# Patient Record
(demographics unavailable — no encounter records)

---

## 2018-01-27 NOTE — DIAGNOSTIC IMAGING REPORT
EXAMINATION:  CHEST SINGLE (PORTABLE)    



INDICATION:  Shortness of breath, line placement  

\S\ERMD ORDER

\S\[id number]

\S\1001

\S\Y    



COMPARISON:  None

     

FINDINGS:  AP view   



TUBES and LINES:  Right IJ central venous catheter with tip overlying the

cavoatrial junction.



LUNGS:  Low lung volumes.  Bilateral pulmonary edema.   Bibasilar atelectasis.

Both costophrenic angles not included on this exam.



PLEURA:  Unable to evaluate the small pleural effusions but no large pleural

effusions seen. No pneumothorax on limited evaluation.



HEART AND MEDIASTINUM:  Marked enlargement of the cardiac silhouette may be due

to cardiomegaly and/or pericardial effusion.



BONES AND SOFT TISSUES:  No acute osseous lesion.  Soft tissues are

unremarkable.



UPPER ABDOMEN: No free air under the diaphragm.    



IMPRESSION: 

Right adjacent and venous catheter with tip overlying the cavoatrial junction.



Marked enlargement of the cardiac silhouette due to cardiomegaly and/or

pericardial effusion. Bilateral pulmonary edema.





Signed by: Dr. Isabel Rowe M.D. on 1/27/2018 10:21 AM

## 2018-01-27 NOTE — HISTORY AND PHYSICAL
PRIMARY CARE PROVIDER:  Dr. Joe Valladares



CHIEF COMPLAINT:  Respiratory failure.



HISTORY OF PRESENT ILLNESS:  Ms. Oliveira is a 57-year-old who presented with 

acute shortness of breath that started in the last couple of days with 

wheezing and dyspnea on exertion and marked respiratory distress.  She was 

becoming somnolent here in the ER.  ABG showed a pCO2 of 82 and a pH of 

7.28.  The patient was urgently intubated for acute respiratory failure due 

to hypercapnia.



REVIEW OF SYSTEMS:  Unobtainable as the patient is intubated.



PAST MEDICAL HISTORY:  Significant for COPD is all we know.  



MEDICATIONS:  We have no list of home meds.  



Unclear if the patient has any kind of surgical history.  



PAST MEDICAL HISTORY:  Her family with her son denied any history of 

hypertension or diabetes.  



ALLERGIES:  SHE HAS NO KNOWN DRUG ALLERGIES. 



FAMILY HISTORY:  Unremarkable.



SOCIAL HISTORY:  Again, unknown at this time.



PHYSICAL EXAMINATION 

PSYCHIATRIC:  Is unobtainable.  Patient is sedated.  She is morbidly obese. 



VITAL SIGNS:  Blood pressure 122/59.  She was not hypotensive at any time.  

Pulse is 94.  She was a little more tachycardic around 114 on arrival.  

Temperature is 98.5, respiratory rate 30, O2 100% on the ventilator. 

HEENT:  Her head is atraumatic.  Eyes are anicteric with clear 

conjunctivae.  Ears and nares are without erythema or discharge.  She is 

orally intubated. 

NECK:  Supple.  No mass or thyromegaly. 

LYMPHATIC SYSTEM:  She has no palpable cervical, axillary or inguinal 

adenopathy. 

CARDIOVASCULAR:  Her heart has a regular rate and rhythm with frequent PACs 

and PVCs.  She has no carotid bruit.  She has no peripheral edema.  Weak 

dorsal pedal pulses. 

RESPIRATORY:  Reveals coarse breath sounds with some expiratory wheezing.  

She is currently being ventilated on assist control. 

GASTROINTESTINAL:  Abdomen is soft without organomegaly, masses or 

tenderness.  She has normal bowel sounds present. 

CUTANEOUS:  Her skin is warm and dry to touch with no rash or skin 

breakdown. 

MUSCULOSKELETAL:  Her joints are in normal alignment without erythema or 

swelling.  She has no calf tenderness. 

NEUROLOGIC:  Nonfocal.  She is moving all extremities.  She is sedated and 

not following commands. 



DIAGNOSTIC STUDIES:  Initial chest x-ray showed cardiomegaly and pulmonary 

edema.  Subsequent chest x-ray showed intubation and worsening in the 

pulmonary edema.  Her UA is clear.  Flu screen is negative.  Her EKG is 

read as AFib/flutter, but actually appears to be normal sinus rhythm, clear 

P-waves and a rate of 75.  It is unlikely to be a flutter.  Her lactic acid 

is 6.2.  BNP 1909.2.  Troponin 0.081.  Blood gas shows a pH of 7.28, 82 CO2 

and 109 O2.  Her chemistry shows normal electrolytes.  CO2 is 36, 

creatinine 2.09, BUN 45 for a GFR of 24.  Calcium 9.2.  Glucose 143.  

Transaminases, bilirubin and alk phos were normal.  CBC shows a white count 

of 8.03 with 69% neutrophils, 12% band forms, 10% lymphocytes, 9% 

monocytes.  Hemoglobin 11.3, hematocrit 36.8, and platelet count 194,000.  

Coags are normal.



IMPRESSION AND PLAN 

1.  Acute respiratory failure:  The patient is being admitted to the 

intensive care unit on ventilator support.  Pulmonology will be consulted 

for ventilator management.

2.  Acute exacerbation of chronic obstructive pulmonary disease:  The 

patient will be receiving aggressive nebulizer treatments, intravenous 

Solu-Medrol, intravenous Zithromax, and Rocephin.



3.  Acute-on-chronic systolic heart failure:  The patient is started on 

intravenous Lasix q.8 h.  We have no old records here.  Will obtain an 

echocardiogram to assess left ventricular function.

4.  Acute kidney injury versus chronic kidney disease:  The patient's renal 

function will be monitored while the patient is receiving Lasix.  Will 

consider nephrology consult.

5.  Hyperglycemia without history of diabetes:  Will check hemoglobin A1c 

and place the patient on sliding scale insulin.

6.  For prophylaxis, the patient will be on heparin for deep venous 

thrombosis prophylaxis and Pepcid for gastrointestinal prophylaxis.



  







DD:  01/27/2018 13:50

DT:  01/27/2018 15:25

Job#:  [id number]

## 2018-01-27 NOTE — DIAGNOSTIC IMAGING REPORT
EXAMINATION:  CHEST SINGLE (PORTABLE)    



INDICATION:       

\S\intubation

\S\[id number]

\S\1100    



COMPARISON:  Chest radiograph from 1/27/2018

     

FINDINGS:  AP view   



TUBES and LINES:  Interval intubation with endotracheal tube tip located 2.1 cm

above the carly. Right IJ central venous catheter with tip now overlying the

upper SVC, this appears higher when compared to most recent chest radiograph.



LUNGS:  No lumbar levels.  Worsening bilateral pulmonary edema.   Superimposed

infection cannot be excluded.



PLEURA:  No pleural effusion or pneumothorax.



HEART AND MEDIASTINUM:  Marked enlargement of the cardiac silhouette may be due

to cardiomegaly and/or pericardial effusion.  



BONES AND SOFT TISSUES:  No acute osseous lesion.  Soft tissues are

unremarkable.



UPPER ABDOMEN: No free air under the diaphragm.    



IMPRESSION: 

Interval intubation. Right IJ central venous catheter tip is higher in the SVC

now.



Worsening bilateral pulmonary edema. Superimposed infection cannot be excluded.





Signed by: Dr. Isabel Rowe M.D. on 1/27/2018 11:15 AM

## 2018-01-28 NOTE — DIAGNOSTIC IMAGING REPORT
Exam:Abdominal radiograph one view



History:Enteric tube placement



Comparison: None available



Findings:See impression





Impression:



Nonobstructive bowel gas pattern.



Enteric tube with the distal tip left hemiabdomen presumably in the lateral

stomach body.



Signed by: Dr. Andrew Palisch, M.D. on 1/28/2018 5:18 PM

## 2018-01-28 NOTE — CONSULTATION
DATE OF CONSULTATION:  January 27, 2018 



PULMONARY CRITICAL CARE CONSULTATION 



REASON FOR CONSULT:  ICU management and respiratory failure.



HPI:  Ms. Oliveira is a 57-year-old female, patient of Dr. Valladares, who 

presented with worsening dyspnea and shortness of breath.   Patient was 

intubated in the emergency room.  Chest x-ray is showing evidence of fluid 

overload and large pulmonary vasculature.  Patient is morbidly obese.  

Currently, sedated and intubated.  The source of history is the chart and 

the patient's daughter.  Per daughter, the patient has a history of 

obstructive sleep apnea.  She has been a smoker for 45 years and quit a 

year ago.  She also has a history of heart failure.  She uses home oxygen.  

She was intubated 2 months ago at [facility] with 

similar problems.  She is noncompliant with CPAP at home.  Currently, 

living in a hotel as she is displaced from [place].  



REVIEW OF SYSTEMS:  Unable to elicit any as the patient is intubated and 

sedated.



PAST MEDICAL HISTORY:  Obesity, obstructive sleep apnea, congestive heart 

failure, hypertension, and diabetes.    



FAMILY AND SOCIAL HISTORY:  Ex-smoker.  Smoked for 45 years.  Patient does 

not drink.  No alcohol history.  



PHYSICAL EXAMINATION

VITAL SIGNS:  Temperature 98.5, pulse of 85, blood pressure 135/75, 

respiratory rate of 18.

SKIN:  Warm and dry. 

HEENT:  Head is atraumatic and normocephalic.  Pupils reactive.  She is 

intubated and morbidly obese. 

NECK:  Supple. 

CHEST:  Clear to auscultation bilaterally.  Crackles on the bases. 

HEART:  S1 and S2 audible. 

ABDOMEN:  Soft, nontender and nondistended. 

EXTREMITIES:  Pedal edema. 

NEUROLOGIC:  Sedated and intubated. 



LABS:  White count of 8000, hemoglobin 11.3 and platelets 194,000.  

Chemistry:  Sodium 141, potassium 5.1, chloride 98, BUN 45, creatinine 

2.09.  AST and ALT normal.  Chest x-ray:  I have reviewed the images and is 

showing evidence of pulmonary edema with possibility of pneumonia as well.  

BNP is 1900.



ASSESSMENT AND PLAN:  Ms. Oliveira is a 57-year-old female who presented acute 

hypoxic respiratory failure.  Patient has a history of chronic hypoxia, 

chronic obstructive pulmonary disease, obstructive sleep apnea, likely 

obesity hypoventilation.  In looking at her body habitus, she is morbidly 

obese.



CURRENT PROBLEMS

1.  Acute-on-chronic hypoxic and hypercapnic respiratory failure:  Arterial 

blood gas done in the emergency room showed pCO2 of 82 and pH of 7.28.

2.  Morbid obesity.

3.  History of obstructive sleep apnea.

4.  Likely has chronic obstructive pulmonary disease:  Ex-smoker, 

50-pack-year smoking history.

5.  Possibility of congestive heart failure and pulmonary hypertension as 

well.

6.  Possible pneumonia.



PLAN

1.  Continue the patient on ventilator support.  Currently, on FIO2 of 100% 

and PEEP of 5.  I have changed the PEEP to 8 and decreased the FIO2 to 90% 

to wean the oxygen to keep the O2 sat more than or equal to 92%.

2.  Agree with IV Rocephin, azithromycin and Solu-Medrol.

3.  Lasix 40 mg IV q.8 h.

4.  DuoNeb treatment.

5.  Continue the patient on heparin subcutaneous for DVT prophylaxis.

6.  Acute kidney injury is likely due to fluid overload.  Hopefully, will 

improve with diuresis.  Discussed with the patient's daughter at bedside in 

detail.  Critical care time 45 minutes.  



 





DD:  01/27/2018 14:52

DT:  01/28/2018 02:15

Job#:  [id number]

## 2018-01-28 NOTE — DIAGNOSTIC IMAGING REPORT
Examination: Single AP view of the chest.



COMPARISON: January 27, 2018



INDICATION: Respiratory failure

     

DISCUSSION:



Lines/tubes:  Stable endotracheal tube, enteric tube, and right IJ catheter.



Lungs:  Stable interstitial and alveolar consolidation bilaterally.



Pleura:  Layering effusions.



Heart and mediastinum:  Heart size enlarged.



Bones and soft tissues:  No acute bony abnormalities.     



IMPRESSION:

 

1.   Cardiomegaly with stable pulmonary edema. Correlate for underlying

pneumonia.



Signed by: Dr. Andrew Palisch, M.D. on 1/28/2018 7:32 AM

## 2018-01-29 NOTE — DIAGNOSTIC IMAGING REPORT
PROCEDURE:

A single AP view of the chest.

 

COMPARISON: 1/28/18

INDICATIONS:   INTUBATION

     

FINDINGS:

Lines/tubes: Stable endotracheal and nasogastric tubes. Stable right 

internal jugular central line.

 

Lungs: Limited by low lung volumes and body habitus. Central vascular 

congestion and mild interstitial edema.

 

Pleura:  There is no visible pneumothorax. Trace bilateral pleural 

effusions suspected.

 

Heart and mediastinum: Enlarged cardiomediastinal silhouette. Aorta is 

calcified and mildly tortuous.

 

Bones:  No acute bony abnormality.

 

IMPRESSION: 

 

No significant interval change from prior exam.

Central vascular congestion and mild interstitial edema.

Enlarged cardiac silhouette and suspected trace bilateral pleural 

effusions.

 

 

Dictated by:  Misael Morgan M.D. on 1/29/2018 at 11:29     

Electronically approved by:  Misael Morgan M.D. on 1/29/2018 at 11:29

## 2018-01-30 NOTE — DIAGNOSTIC IMAGING REPORT
PROCEDURE:

A single AP view of the chest.

 

COMPARISON: Portable chest 1/29/2018.

 

INDICATIONS:   Respiratory failure, Intubated

     

FINDINGS:

Lines/tubes: Right internal jugular temporary central venous catheter 

with tip projecting over the expected region of the superior vena cava. 

 

 

Endotracheal catheter is present with the tip projecting over the 

expected region of the trachea, positioned 4 cm from the carly. 

 

Enteric feeding catheter with tip extending below the inferior margin 

of the examination, likely within the gastric body.

 

Lungs: Airspace opacity in the left lung base. No parenchymal mass.

 

Pleura:  There is no pleural effusion or pneumothorax.

 

Heart and mediastinum:  The heart and the mediastinum are unremarkable.

 

Bones: Degenerative changes of the thoracic spine.

 

IMPRESSION: 

 

Airspace opacity in the left lung base may represent atelectasis or 

developing pneumonia.

 

Dictated by:  Dru Whyte M.D. on 1/30/2018 at 8:36     

Electronically approved by:  Dru Whyte M.D. on 1/30/2018 at 8:36

## 2018-01-31 NOTE — DIAGNOSTIC IMAGING REPORT
PROCEDURE:US CHEST (INCL MEDIASTINUM)

COMPARISON:[facility], DX, CHEST SINGLE (PORTABLE), 

1/30/2018, 7:49.

INDICATIONS:left effusion

TECHNIQUE:Grayscale ultrasound chest bilaterally

 

FINDINGS:

No evidence of pleural effusion bilaterally.

 

 

CONCLUSION:

No conspicuous pleural effusion.

 

 

Dictated by:  Arsen Cabello M.D. on 1/31/2018 at 11:55     

Electronically approved by:  Arsen Cabello M.D. on 1/31/2018 at 

11:55

## 2018-02-01 NOTE — DIAGNOSTIC IMAGING REPORT
PROCEDURE:

A single AP view of the chest.

 

COMPARISON: Chest radiograph 1/30/2018

INDICATIONS:   RESPIRATORY FAILURE

     

FINDINGS:

Lines/tubes:  Previous right IJ central venous tip overlies the 

expected brachiocephalic confluence.  Previous endotracheal tube 

currently not visualized.

 

Lungs: Persistent retrocardiac opacity may reflect atelectasis or 

pneumonia.

 

Pleura: Likely small bilateral pleural effusions. No pneumothorax.

 

Heart and mediastinum: Stable enlargement of the cardiac silhouette.

 

Bones:  No acute bony abnormality.

 

IMPRESSION: 

Persistent retrocardiac opacity may reflect atelectasis or pneumonia.

 

 

 

Dictated by:  Romeo Short M.D. on 2/01/2018 at 14:25     

Electronically approved by:  Romeo Short M.D. on 2/01/2018 at 14:26

## 2018-02-04 NOTE — DIAGNOSTIC IMAGING REPORT
EXAMINATION: Chest,  CHEST SINGLE (PORTABLE)    



INDICATION: Chest pain



COMPARISON:  Portable chest 1/28/2018

     

FINDINGS:    



LINES:  Right internal jugular temporary central venous catheter with tip

projecting over the expected region of the superior vena cava.



Heart:  Normal cardiac silhouette.



Vascular: The pulmonary vasculature is within normal limits.  Atherosclerotic

calcifications of the aortic arch.



Mediastinum: No mediastinal, hilar, or axillary mass or lymphadenopathy.



Lungs: No parenchymal mass.  Airspace opacity in the left lung base.



Pleura:  Small left pleural effusion.  No pneumothorax.



Bones: No acute osseous abnormality.  Degenerative changes of the thoracic

spine.



Soft tissues:  Normal.



Impression: 

Airspace opacity in the left lung base may represent a developing pneumonia. 

Small left pleural effusion.



Signed by: Dr. Dru Whyte M.D. on 2/4/2018 8:55 AM

## 2018-02-06 NOTE — DISCHARGE SUMMARY
ADMISSION DIAGNOSES 

1. Acute respiratory failure. 

2. Acute exacerbation of chronic obstructive pulmonary disease. 

3. Acute-on-chronic systolic heart failure. 

4. Acute kidney injury versus chronic kidney disease. 

5. Hyperglycemia.  



DISCHARGE DIAGNOSES   

1. Acute respiratory failure. 

2. Acute exacerbation of chronic obstructive pulmonary disease. 

3. Acute-on-chronic systolic heart failure. 

4. Acute kidney injury versus chronic kidney disease. 

5. Hyperglycemia.  

6.   Ruled out influenza.  

7.   Hypermagnesemia.  



HISTORY:   The patient has a history of COPD, diabetes, CHF, CKD. 



HOSPITAL COURSE:   A 57-year-old female presented with shortness of breath 

that started a couple of days ago and also complains of wheezing, dyspnea 

on exertion and respiratory distress. ABG was done in the ER that showed a 

pCO2 of 82 and a pH of 7.28.  The patient was intubated due to hypercapnia. 

 The patient was initially put in ICU on ventilator support and pulmonary 

was consulted.  The patient was started on aggressive nebulizer treatment, 

IV Solu-Medrol, Zithromax and Rocephin.  The patient also started on Lasix 

q.8 h.  Echo was done that showed EF of 55% to 60% and mild mitral regurg 

present.  Chest x-ray on admission showed enlargement of cardiac silhouette 

due to cardiomegaly and/or pericardial effusion, bilateral pulmonary edema. 

 Ultrasound of the chest showed no pleural effusion.  Blood cultures were 

negative.  Urine culture was negative.  A few days after admission, a 

sputum culture was completed that showed MRSA.  Dr. Meraz, pulmonologist, 

was consulted to take care of the ventilator.  The patient was started on 

FiO2 of 100%, PEEP of 5, decreased to PEEP of 8 and FiO2 of 90.  The 

patient was weaned off but continued IV antibiotics and Solu-Medrol along 

with Lasix q.8 h. The patient was given IV vancomycin after the sputum 

culture came back positive for MRSA.  Lasix was tapered down to 40 mg 

daily.  The patient will be discharged home with family as she has oxygen 

at home and all the ME equipment that she needs.  She is requesting BiPAP 

for home and has done a sleep apnea study about a year ago.  Case 

management sent documentation to try to get her that at home.  She will 

follow up with pulmonary in 1-2 weeks and will resume all home medicines 

plus Bactrim meds, prednisone and Lasix.  





Dictated by:  Catrina [place], NP 

 



 _________________________________

BRIAN MELVIN MD



DD:  02/06/2018 17:45

DT:  02/06/2018 18:10

Job#:  [id number]

## 2018-03-05 NOTE — XMS REPORT
Patient Summary Document

 Created on: 2018



MARGY PEÑA

External Reference #: [id number]

: 1960

Sex: Female



Demographics







 Address  [address] [place] [address]

 

 Home Phone  [phone]

 

 Preferred Language  Unknown

 

 Marital Status  Unknown

 

 Cheondoism Affiliation  Unknown

 

 Race  Unknown

 

 Ethnic Group  Unknown





Author







 [facility]  [facility]

 

 [facility]

 

 Address  Unknown

 

 Phone  Unavailable







Care Team Providers







 Care Team Member Name  Role  Phone

 

 BRIAN MELVIN  Unavailable  Unavailable







Problems

This patient has no known problems.



Allergies, Adverse Reactions, Alerts

This patient has no known allergies or adverse reactions.



Medications

This patient has no known medications.



Results







 Test Description  Test Time  Test Comments  Text Results  Atomic Results  
Result Comments









 CHEST SINGLE (PORTABLE)            [place] [address]      Patient Name: MARGY PEÑA   MR #: [id number]    : 1960 Age/Sex: 57/F  Acct #: 
[id number] Req #: [id number]  [facility] Physician: BRIAN MELVIN MD    Ordered by: 
Sagar Chan NP  Report #: [id number]   Location: MED/SURG2  Room/Bed: [facility] 
   _____________________________________________________________________________
______________________    Procedure: [id number] DX/CHEST SINGLE (PORTABLE)  Exam 
Date: 18                            Exam Time: 0745       REPORT STATUS: 
Signed    EXAMINATION: Chest,  CHEST SINGLE (PORTABLE)          INDICATION: 
Chest pain      COMPARISON:  Portable chest 2018           FINDINGS:      
    LINES:  Right internal jugular temporary central venous catheter with tip   
projecting over the expected region of the superior vena cava.      Heart:  
Normal cardiac silhouette.      Vascular: The pulmonary vasculature is within 
normal limits.  Atherosclerotic   calcifications of the aortic arch.      
Mediastinum: No mediastinal, hilar, or axillary mass or lymphadenopathy.      
Lungs: No parenchymal mass.  Airspace opacity in the left lung base.      Pleura
:  Small left pleural effusion.  No pneumothorax.      Bones: No acute osseous 
abnormality.  Degenerative changes of the thoracic   spine.      Soft tissues:  
Normal.      Impression:    Airspace opacity in the left lung base may 
represent a developing pneumonia.    Small left pleural effusion.      Signed by
: Dr. Filomena Santana M.D. on 2018 8:55 AM        Dictated By: FILOMENA SANTANA MD  
Electronically Signed By: FILOMENA SANTANA MD on 18  Transcribed By: 
RICKY on 18       COPY TO:   SAGAR CHAN NP           

 

 CHEST SINGLE (PORTABLE)            [place] [address]      Patient Name: MARGY PEÑA   MR #: [id number]    : 1960 Age/Sex: 57/F  Acct #: 
[id number] Req #: [id number]  Adm Physician: BRIAN MELVIN MD    Ordered by: 
Sagar Chan NP  Report #: [id number]   Location: MED/SURG  Room/Bed: [facility] 
   _____________________________________________________________________________
______________________    Procedure: [id number] DX/CHEST SINGLE (PORTABLE)  Exam 
Date: 18                            Exam Time: 1340       REPORT STATUS: 
Signed    PROCEDURE:   A single AP view of the chest.       COMPARISON: Chest 
radiograph 2018   INDICATIONS:   RESPIRATORY FAILURE           FINDINGS:   
Lines/tubes:  Previous right IJ central venous tip overlies the    expected 
brachiocephalic confluence.  Previous endotracheal tube    currently not 
visualized.       Lungs: Persistent retrocardiac opacity may reflect 
atelectasis or    pneumonia.       Pleura: Likely small bilateral pleural 
effusions. No pneumothorax.       Heart and mediastinum: Stable enlargement of 
the cardiac silhouette.       Bones:  No acute bony abnormality.       
IMPRESSION:    Persistent retrocardiac opacity may reflect atelectasis or 
pneumonia.               Dictated by:  Romeo Alex M.D. on 2018 at 14:
25        Electronically approved by:  Romeo Alex M.D. on 2018 at 14:
26                   Dictated By: ROMEO ALEX MD  Electronically Signed By: 
ROMEO ALEX MD on 18  Transcribed By: ELVIA on 18       
COPY TO:   SAGAR CHAN NP           

 

 US CHEST (INCL MEDIASTINUM)            [place] [address]      Patient Name: 
MARGY PEÑA   MR #: [id number]    : 1960 Age/Sex: 57/F  Acct #: 
[id number] Req #: [id number]  Adm Physician: BRIAN MELVIN MD    Ordered by: 
YORDAN SOTO MD  Report #: [id number]   Location: ICU  [facility]Bed[facility]    ___
________________________________________________________________________________
________________    Procedure: [id number] US/US CHEST (INCL MEDIASTINUM)  Exam 
Date:                             Exam Time:        REPORT STATUS: Signed    
PROCEDURE:   US CHEST (INCL MEDIASTINUM)   COMPARISON:   [facility]
, , CHEST SINGLE (PORTABLE),    2018, 7:49.   INDICATIONS:   left 
effusion   TECHNIQUE:   Grayscale ultrasound chest bilaterally       FINDINGS: 
     No evidence of pleural effusion bilaterally.           CONCLUSION:      No 
conspicuous pleural effusion.           Dictated by:  Royce Vela M.D. 
on 2018 at 11:55        Electronically approved by:  Royce Vela M.D. on 2018 at    11:55                Dictated By: ROYCE VELA MD  
Electronically Signed By: ROYCE VELA MD on 18 115  Transcribed By: 
ELVIA on 18 1155       COPY TO:   YORDAN SOTO MD           

 

 CHEST SINGLE (PORTABLE)            [address] [address]      Patient Name: MARGY PEÑA   MR #: [id number]    : 1960 Age/Sex: 57/F  Acct #: 
[id number] Req #: [id number]  Adm Physician: BRIAN MELVIN MD    Ordered by: 
YORDAN SOTO MD  Report #: [id number]   Location: ICU  Room/Bed: ICU [facility] access hospital    ___
________________________________________________________________________________
________________    Procedure: [id number] DX/CHEST SINGLE (PORTABLE)  Exam Date: 
18                            Exam Time: 0740       REPORT STATUS: Signed
    PROCEDURE:   A single AP view of the chest.       COMPARISON: Portable 
chest 2018.       INDICATIONS:   Respiratory failure, Intubated           
FINDINGS:   Lines/tubes: Right internal jugular temporary central venous 
catheter    with tip projecting over the expected region of the superior vena 
cava.            Endotracheal catheter is present with the tip projecting over 
the    expected region of the trachea, positioned 4 cm from the carly.        
Enteric feeding catheter with tip extending below the inferior margin    of the 
examination, likely within the gastric body.       Lungs: Airspace opacity in 
the left lung base. No parenchymal mass.       Pleura:  There is no pleural 
effusion or pneumothorax.       Heart and mediastinum:  The heart and the 
mediastinum are unremarkable.       Bones: Degenerative changes of the thoracic 
spine.       IMPRESSION:        Airspace opacity in the left lung base may 
represent atelectasis or    developing pneumonia.       Dictated by:  Filomena Santana M.D. on 2018 at 8:36        Electronically approved by:  Filomena Santana M.D. on 2018 at 8:36                Dictated By: FILOMENA SANTANA MD  
Electronically Signed By: FILOMENA SANTANA MD on 18  Transcribed By: 
ELVIA on 18       COPY TO:   YORDAN SOTO MD           

 

 CHEST SINGLE (PORTABLE)            [address] [address]      Patient Name: MARGY PEÑA   MR #: [id number]    : 1960 Age/Sex: 57/F  Acct #: 
[id number] Req #: [id number]  Adm Physician: BRIAN MELVIN MD    Ordered by: 
YORDAN SOTO MD  Report #: [id number]   Location: ICU  Room/Bed: ICU [facility]    ___
________________________________________________________________________________
________________    Procedure: [id number] DX/CHEST SINGLE (PORTABLE)  Exam Date: 
18                            Exam Time: 0910       REPORT STATUS: Signed
    PROCEDURE:   A single AP view of the chest.       COMPARISON: 18   
INDICATIONS:   INTUBATION           FINDINGS:   Lines/tubes: Stable 
endotracheal and nasogastric tubes. Stable right    internal jugular central 
line.       Lungs: Limited by low lung volumes and body habitus. Central 
vascular    congestion and mild interstitial edema.       Pleura:  There is no 
visible pneumothorax. Trace bilateral pleural    effusions suspected.       
Heart and mediastinum: Enlarged cardiomediastinal silhouette. Aorta is    
calcified and mildly tortuous.       Bones:  No acute bony abnormality.       
IMPRESSION:        No significant interval change from prior exam.   Central 
vascular congestion and mild interstitial edema.   Enlarged cardiac silhouette 
and suspected trace bilateral pleural    effusions.           Dictated by:  
Misael Mathis M.D. on 2018 at 11:29        Electronically approved by:  
Misael Mathis M.D. on 2018 at 11:29                   Dictated By: MISAEL MATHIS MD  Electronically Signed By: MISAEL MATHIS MD on 18  
Transcribed By: ELVIA on 18       COPY TO:   YORDAN SOTO MD         
  

 

 [facility] ([place] [address]      Patient Name: MARGY PEÑA
   MR #: [id number]    : 1960 Age/Sex: 57/F  Acct #: [id number] Req #
: [id number]  Adm Physician: BRIAN MELVIN MD    Ordered by: BRIAN MELVIN MD  
Report #: [id number]   Location: ICU  Room/Bed: -1    ____________________
_______________________________________________________________________________
    Procedure: [id number] DX/ABDOMEN-1VIEW (KUB)  Exam Date: 18           
                 Exam Time: 1630       REPORT STATUS: Signed    Exam:Abdominal 
radiograph one view      History:Enteric tube placement      Comparison: None 
available      Findings:See impression         Impression:      Nonobstructive 
bowel gas pattern.      Enteric tube with the distal tip left hemiabdomen 
presumably in the lateral   stomach body.      Signed by: Dr. Andrew Palisch, M.D. on 2018 5:18 PM        Dictated By: ANDREW R PALISCH MD  
Electronically Signed By: ANDREW R PALISCH MD on 18  Transcribed By: 
RICKY on 18       COPY TO:   BRIAN MELVIN MD           

 

 CHEST SINGLE (PORTABLE)            [place] [address]      Patient Name: MARGY PEÑA   MR #: [id number]    : 1960 Age/Sex: 57/F  Acct #: 
[id number] Req #: [id number]  Adm Physician: BRIAN MELVIN MD    Ordered by: 
BRIAN MELVIN MD  Report #: [id number]   Location: ICU  Room/Bed: -    _
________________________________________________________________________________
__________________    Procedure: [id number] DX/CHEST SINGLE (PORTABLE)  Exam Date
: 18                            Exam Time: 0640       REPORT STATUS: 
Signed    Examination: Single AP view of the chest.      COMPARISON:       INDICATION: Respiratory failure           DISCUSSION:      Lines/
tubes:  Stable endotracheal tube, enteric tube, and right IJ catheter.      
Lungs:  Stable interstitial and alveolar consolidation bilaterally.      Pleura
:  Layering effusions.      Heart and mediastinum:  Heart size enlarged.      
Bones and soft tissues:  No acute bony abnormalities.           IMPRESSION:    
   1.   Cardiomegaly with stable pulmonary edema. Correlate for underlying   
pneumonia.      Signed by: Dr. Andrew Palisch, M.D. on 2018 7:32 AM        
Dictated By: ANDREW R PALISCH MD  Electronically Signed By: ANDREW R PALISCH MD 
on 18  Transcribed By: RICKY on 18       COPY TO:   
BRIAN MELVIN MD           

 

 CHEST SINGLE (PORTABLE)            [place] [address]      Patient Name: MARGY PEÑA   MR #: [id number]    : 1960 Age/Sex: 57/F  Acct #: 
[id number] Req #: [id number]  Adm Physician:     Ordered by: FANTA KELLY MD  Report #: [id number]   Location: ER  Room/Bed:     ________________________
___________________________________________________________________________    
Procedure: [id number] DX/CHEST SINGLE (PORTABLE)  Exam Date: 18           
                 Exam Time: 1100       REPORT STATUS: Signed    EXAMINATION:  
CHEST SINGLE (PORTABLE)          INDICATION:                      COMPARISON:  
Chest radiograph from 2018           FINDINGS:  AP view         TUBES and 
LINES:  Interval intubation with endotracheal tube tip located 2.1 cm   above 
the carly. Right IJ central venous catheter with tip now overlying the   upper 
SVC, this appears higher when compared to most recent chest radiograph.      
LUNGS:  No lumbar levels.  Worsening bilateral pulmonary edema.   Superimposed 
  infection cannot be excluded.      PLEURA:  No pleural effusion or 
pneumothorax.      HEART AND MEDIASTINUM:  Marked enlargement of the cardiac 
silhouette may be due   to cardiomegaly and/or pericardial effusion.        
BONES AND SOFT TISSUES:  No acute osseous lesion.  Soft tissues are   
unremarkable.      UPPER ABDOMEN: No free air under the diaphragm.          
IMPRESSION:    Interval intubation. Right IJ central venous catheter tip is 
higher in the SVC   now.      Worsening bilateral pulmonary edema. Superimposed 
infection cannot be excluded.         Signed by: Dr. Isabel Westfall M.D. on 2018 11:15 AM        Dictated By: ISABEL WESTFALL MD  
Electronically Signed By: ISABEL WESTFALL MD on 18 1115  
Transcribed By: RICKY on 18 1115       COPY TO:   FANTA KELLY MD 
          

 

 CHEST SINGLE (PORTABLE)            [place] [address]      Patient Name: MARGY PEÑA   MR #: [id number]    : 1960 Age/Sex: 57/F  Acct #: 
[id number] Req #: [id number]  Adm Physician:     Ordered by: FANTA KELLY MD  Report #: [id number]   Location: ER  Room/Bed:     ________________________
___________________________________________________________________________    
Procedure: [id number] DX/CHEST SINGLE (PORTABLE)  Exam Date: 18           
                 Exam Time: 1001       REPORT STATUS: Signed    EXAMINATION:  
CHEST SINGLE (PORTABLE)          INDICATION:  Shortness of breath, line 
placement                    COMPARISON:  None           FINDINGS:  AP view    
     TUBES and LINES:  Right IJ central venous catheter with tip overlying the 
  cavoatrial junction.      LUNGS:  Low lung volumes.  Bilateral pulmonary 
edema.   Bibasilar atelectasis.   Both costophrenic angles not included on this 
exam.      PLEURA:  Unable to evaluate the small pleural effusions but no large 
pleural   effusions seen. No pneumothorax on limited evaluation.      HEART AND 
MEDIASTINUM:  Marked enlargement of the cardiac silhouette may be due   to 
cardiomegaly and/or pericardial effusion.      BONES AND SOFT TISSUES:  No 
acute osseous lesion.  Soft tissues are   unremarkable.      UPPER ABDOMEN: No 
free air under the diaphragm.          IMPRESSION:    Right adjacent and venous 
catheter with tip overlying the cavoatrial junction.      Marked enlargement of 
the cardiac silhouette due to cardiomegaly and/or   pericardial effusion. 
Bilateral pulmonary edema.         Signed by: Dr. Isabel Westfall M.D. 
on 2018 10:21 AM        Dictated By: ISABEL WESTFALL MD  
Electronically Signed By: ISABEL WESTFALL MD on 18 1021  
Transcribed By: RICKY on 18 1021       COPY TO:   FANTA KELLY MD

## 2018-03-05 NOTE — XMS REPORT
Continuity of Care Document

 Created on: 2018



MARGY OLIVEIRA

External Reference #: [id number]

: 1960

Sex: Female



Demographics







 Address  [address] [place]  [address]

 

 Home Phone  [phone]

 

 Preferred Language  Unknown

 

 Marital Status  Unknown

 

 [employer] Affiliation  Unknown

 

 Race  White

 

 Ethnic Group  Unknown





Author







 Author  [facility]

 

 Organization  [facility]

 

 Address  [address]

[place]  [address]



 

 Phone  Unavailable







Support







 Name  Relationship  Address  Phone

 

 BRIAN MELVIN MD  Caregiver  [address] [address]  [phone]

 

 BRIAN MELVIN MD  Caregiver  [address] [address]  [phone]

 

 BRIAN MELVIN MD  Caregiver  [address] [address]  [phone]

 

 BRIAN MELVIN MD  Caregiver  [address] [address]  [phone]

 

 FANTA KELLY MD  Caregiver  [address]

[place]  [address]  Unavailable

 

 SAGAR OLIVEIRA  Next Of Kin  [address] [place], [place]  [phone]







Care Team Providers







 Care Team Member Name  Role  Phone

 

 BRIAN MELVIN MD  PCP  [phone]







Insurance Providers







 Guarantor  Margy Oliveira

 

 Address  [address] [place] [address]

 

 Phone  [phone]

 

 Email  [email or web address]











 Payer  [employer] Complete

 

 Policy Number  [id number]

 

 Subscriber's Name  TeeMargy

 

 Relationship  18 Self / Same As Patient

 

 Effective Date  18







Advance Directives







 Directive  Response  Recorded Date/Time

 

 Does the patient have an advance directive?  No  18 12:53pm

 

 If yes, is advance directive on file with [facility]?  No  18 12:53pm

 

 If not on file with [facility] will patient provide a copy?  No  18 12:53pm

 

 Do you have a Directive to Physician?  No  18 10:10am

 

 Do you have a Medical Power of ?  No  18 10:10am

 

 Do you have an out of hospital Do Not Resuscitate Order?  No  18 10:10am

 

 Do you have any special needs we should be aware of?  No  18 10:10am

 

 Do you have a support person here with you today?  Yes  18 10:10am

 

 Did patient receive Notice of Privacy Practices?  Yes  18 10:10am

 

 Did patient receive patient rights and responsibilities?  Yes  18 10:10am







Problems

No problem information available.



Medications





Current Home Medications





 Medication  Dose  Units  Route  Directions  Days  Qty  Instructions  Start Date

 

 Albuterol/Ipratropium Nebulize 3 Ml Inha  3  Ml  Nebullizer  Rt Q4h  30 Days  
      18

 

 Furosemide 40 Mg Tablet  40  Mg  Oral  Daily     30 Tab      

 

 Furosemide 40 Mg Tablet  40  Mg  Oral  Daily  30 Days        18

 

 Ipratropium/Albuterol Sulfate (Combivent Respimat Inhal Spray) 4 Gm Aer.w.adap
  4  Gm  Inhalation  Twice A Day            

 

 Prednisone 10 Mg Tab  10  Mg  Oral  Daily  5 Days        18

 

 Sulfamethoxazole/Trimethoprim (Bactrim Ds Tablet) 1 Each Tablet  1  Each  Oral
  Twice A Day  20 Days        18







Social History







 Social History Problem  Response  Recorded Date/Time  Onset Date  Status

 

 Hx Psychiatric Problems  No  2018 12:53pm  Not Applicable  Not Applicable

 

 Hx Eating Disorder  No  2018 12:53pm  Not Applicable  Not Applicable

 

 Hx Substance Use Disorder  No  2018 12:53pm  Not Applicable  Not 
Applicable

 

 Hx Depression  No  2018 12:53pm  Not Applicable  Not Applicable

 

 Hx Alcohol Use  No  2018 12:53pm  Not Applicable  Not Applicable

 

 Hx Substance Use Treatment  No  2018 12:53pm  Not Applicable  Not 
Applicable

 

 Hx Physical Abuse  No  2018 12:53pm  Not Applicable  Not Applicable











 Smoking Status  Start Date  Stop Date

 

 Former smoker      







Hospital Discharge Instructions

No hospital discharge instruction information available.



Plan of Care







 Discharge Date  18 4:52pm

 

 Disposition  HOME, SELF-CARE

 

 Instructions/Education Provided  Pneumonia - Bacterial

 

 Prescriptions  See Medication Section

 

 Referrals  YORDAN SOTO MD (Pulmonary)

Order Date: 3 Weeks

Entered Date: 2018 11:35am

Address:

 [address] [place] [address] [place] [address]

 [phone]

 

 Additional Instructions/Education  DIABETIC DIET







Functional Status







 Query  Response  Date Recorded

 

 FUNCTIONAL STATUS  .

  2018 11:46am

 

 Assistive Devices  Standard Walker

  2018 9:00am

 

 Ambulation Ability  Minimum Assistance

  2018 9:00am

 

 Toileting Ability  Minimum Assistance

  2018 1:49pm







Allergies, Adverse Reactions, Alerts







 Allergen  Type  Severity  Reaction  Status  Last Updated

 

 Penicillin  Allergy  Severe  ITCHING, ANAPHYLAXIS  Active  18

 

 Sulfa (Sulfonamide Antibiotics)  Allergy  Intermediate     Active  18

 

 Naproxen  Allergy  Intermediate     Active  18







Immunizations

No immunization information available.



Vital Signs





Acute Vital Signs





 Vital  Response  Date/Time

 

 Temperature (Fahrenheit)  96.1 degrees F (97.6 - 99.5)  2018 12:35pm

 

 Pulse      

 

    Pulse Rate (adult)  85 bpm (60 - 90)  2018 3:23pm

 

 Respiratory Rate  20 bpm (12 - 24)  2018 3:23pm

 

 Blood Pressure  112/51 mm Hg  2018 12:35pm

 

 Height  5 ft 3 in  2018 9:38am

 

 Weight  231 lb  2018 8:32am

 

 Body Mass Index  40.9 kg/m^2  2018 8:32am







Results





Laboratory Results





 Test Name  Result  Units  Flags  Reference  Collection Date/Time  Result Date/
Time  Comments

 

 White Blood Count  10.17  x10e3/uL     4.8-10.8  2018 5:30am  2018 
6:16am   

 

 Red Blood Count  3.84  x10e6/uL     3.6-5.1  2018 5:30am  2018 6:
16am   

 

 Hemoglobin  12.0  g/dL     12.0-16.0  2018 5:30am  2018 6:16am   

 

 Hematocrit  39.2  %     34.2-44.1  2018 5:30am  2018 6:16am   

 

 Mean Corpuscular Volume  102.1  fL   H  81-99  2018 5:30am  2018 6:
16am   

 

 Mean Corpuscular Hemoglobin  31.3  pg     28-32  2018 5:30am  2018 
6:16am   

 

 Mean Corpuscular Hemoglobin Concent  30.6  g/dL   L  31-35  2018 5:30am  
2018 6:16am   

 

 Red Cell Distribution Width  15.3  %   H  11.7-14.4  2018 5:2018 6:16am   

 

 Platelet Count  202  x10e3/uL     140-360  2018 5:2018 6:
16am   

 

 Neutrophils (%) (Auto)  69.4  %     38.7-80.0  2018 5:2018 6:
16am   

 

 Lymphocytes (%) (Auto)  19.9  %     18.0-39.1  2018 5:2018 6:
16am   

 

 Monocytes (%) (Auto)  7.8   %     4.4-11.3  2018 5:2018 6:
16am   

 

 Eosinophils (%) (Auto)  1.4  %     0.0-6.0  2018 5:2018 6:
16am   

 

 Basophils (%) (Auto)  0.2  %     0.0-1.0  2018 5:2018 6:16am
   

 

 IM GRANULOCYTES %  1.3  %   H  0.0-1.0  2018 5:2018 6:16am   

 

 Neutrophils # (Auto)  7.1      H  2.1-6.9  2018 5:2018 6:
16am   

 

 Lymphocytes # (Auto)  2.0        1.0-3.2  2018 5:2018 6:16am
   

 

 Monocytes # (Auto)  0.8        0.2-0.8  2018 5:2018 6:16am   

 

 Eosinophils # (Auto)  0.1        0.0-0.4  2018 5:2018 6:16am
   

 

 Basophils # (Auto)  0.0        0.0-0.1  2018 5:2018 6:16am   

 

 Absolute Immature Granulocyte (auto  0.13  x10e3/uL   H  0-0.1  2018 5:
2018 6:16am   

 

 Differential Total Cells Counted  100           2018 5:002018 8
:45am   

 

 Neutrophils % (Manual)  90  %   H  40-74  2018 5:00am  2018 8:45am
   

 

 Band Neutrophils %  12  %        2018 9:03am  2018 11:10am   

 

 Lymphocytes % (Manual)  9  %   L  19-48  2018 5:00am  2018 8:45am 
  

 

 Monocytes % (Manual)  2  %   L  3.4-9.0  2018 5:25am  2018 7:16am 
  

 

 Reactive Lymphocytes  1           2018 5:00am  2018 8:45am   

 

 Platelet Estimate  ADEQUATE           2018 5:00am  2018 8:45am   

 

 Platelet Morphology Comment  FEW GIANT           2018 5:00am  2018 
8:45am   

 

 Red Cell Morphology Comment  NORMAL           2018 5:00am  2018 8:
45am   

 

 Prothrombin Time  15.0  seconds   H  11.9-14.5  2018 9:03am  2018 
10:32am   

 

 Prothromb Time International Ratio  1.12           2018 9:03am  2018 10:32am  Oral Anticoagulant Therapy INR Values:



 1. Low Intensity Therapy        1.5 - 2.0



 2. Moderate Intensity Therapy   2.0 - 3.0



 3. High Intensity Therapy(1)    2.5 - 3.5



 4. High Intensity Therapy(2)    3.0 - 4.0



 5. Panic Value INR              > 5.0

 

 Activated Partial Thromboplast Time  31.5  seconds     23.8-35.5  2018 9:
03am  2018 10:32am   

 

 Urine Color  YELLOW        YELLOW  2018 12:00pm  2018 12:23pm   

 

 Urine Clarity  HAZY        CLEAR  2018 12:00pm  2018 12:23pm   

 

 Urine Specific Gravity  1.025        1.010-1.025  2018 12:00pm  2018 12:23pm   

 

 Urine pH  5        5 - 7  2018 12:00pm  2018 12:23pm   

 

 Urine Leukocyte Esterase  NEGATIVE        NEGATIVE  2018 12:00pm  2018 12:23pm   

 

 Urine Nitrite  NEGATIVE        NEGATIVE  2018 12:00pm  2018 12:
23pm   

 

 Urine Protein  1+      H  NEGATIVE  2018 12:00pm  2018 12:23pm   

 

 Urine Glucose (UA)  NEGATIVE        NEGATIVE  2018 12:00pm  2018 12
:23pm   

 

 Urine Ketones  NEGATIVE        NEGATIVE  2018 12:00pm  2018 12:
23pm   

 

 Urine Urobilinogen  1  mg/dL     0.2 - 1  2018 12:00pm  2018 12:
23pm   

 

 Urine Bilirubin  1+      H  NEGATIVE  2018 12:00pm  2018 12:23pm   

 

 Urine Blood  NEGATIVE        NEGATIVE  2018 12:00pm  2018 12:23pm 
  

 

 Urine WBC  0-5  /HPF     0-5  2018 12:00pm  2018 12:35pm   

 

 Urine RBC  0-5  /HPF     0-5  2018 12:00pm  2018 12:35pm   

 

 Urine Bacteria  NONE  /HPF     NONE  2018 12:00pm  2018 12:35pm   

 

 Urine Epithelial Cells  FEW  /LPF     NONE  2018 12:00pm  2018 12:
35pm   

 

 Urine Amorphous Sediment  MANY      H  FEW  2018 12:00pm  2018 12:
35pm   

 

 Sodium Level  140  mmol/L     136-145  2018 5:30am  2018 6:39am   

 

 Potassium Level  4.1  mmol/L     3.5-5.1  2018 5:30am  2018 6:39am
   

 

 Chloride Level  102  mmol/L       2018 5:30am  2018 6:39am   

 

 Influenza Virus Types A,B Antigen  NEGATIVE        NEGATIVE  2018 10:
13am  2018 10:46am   

 

 Carbon Dioxide Level  34  mmol/L   H   5:30am  2018 6:
39am   

 

 Anion Gap  8.1  mmol/L     8-16  2018 5:30am  2018 6:39am   

 

 Blood Urea Nitrogen  54  mg/dL   H  7-2018 5:30am  2018 6:39am
   

 

 Creatinine  1.30  mg/dL   H  0.57-1.11  2018 5:30am  2018 6:39am   

 

 BUN/Creatinine Ratio  42      H  6-25  2018 5:30am  2018 6:39am   

 

 Estimat Glomerular Filtration Rate  42  ML/MIN   L  60-  2018 5:30am  2018 6:39am  Ranges were taken from the National Kidney Disease Education 

Program and the National Kidney Foundation literature.







Reference ranges:



 60 or greater: Normal



 16-59 (for 3 consecutive months): Chronic kidney disease 



 15 or less: Kidney failure

 

 Glucose Level  167  mg/dL   H    2018 5:30am  2018 6:39am   

 

 Calcium Level  8.9  mg/dL     8.4-10.2  2018 5:30am  2018 6:39am   

 

 Bedside Glucose  299  mg/dL   H    2018 3:38pm  2018 4:10pm  
Meter ID: [id number]



 

 Hemoglobin A1c Percent  7.2  %   H  4.0-7.0  2018 4:20am  2018 8:
59am   

 

 Lactic Acid Level  6.2  MG/DL     4.5-19.8  2018 9:03am  2018 10:
31am   

 

 Magnesium Level  2.2  MG/DL   H  1.3-2.1  2018 5:30am  2018 6:39am
   

 

 Iron Level  41  ug/dL   L    2018 4:20am  2018 8:59am   

 

 Total Iron Binding Capacity  273  ug/dL     261-478  2018 4:20am  2018 8:59am   

 

 Percent Iron Saturation  15  %     15-50  2018 4:20am  2018 8:59am
   

 

 Transferrin  195  mg/dL     180-382  2018 4:20am  2018 8:59am   

 

 Total Bilirubin  0.7  mg/dL     0.2-1.2  2018 4:20am  2018 5:07am 
  

 

 Aspartate Amino Transf (AST/SGOT)  23  IU/L     5-34  2018 4:20am  2018 5:07am   

 

 Alanine Aminotransferase (ALT/SGPT)  21  IU/L     0-55  2018 4:20 5:07am   

 

 Total Protein  6.5  g/dL     6.5-8.1  2018 4:202018 5:07am   

 

 Albumin  2.7  g/dL   L  3.5-5.0  2018 4:202018 5:07am   

 

 Globulin  3.8  g/dL   H  2.3-3.5  2018 4:202018 5:07am   

 

 Albumin/Globulin Ratio  0.7      L  0.8-2.0  2018 4:202018 5:
07am   

 

 Alkaline Phosphatase  108  IU/L       2018 4:202018 5:
07am   

 

 B-Type Natriuretic Peptide  85.2  pg/mL     0-100  2018 5:43am  2018 7:12am   

 

 Creatine Kinase  40  IU/L       2018 4:202018 5:29am   

 

 Creatine Kinase MB  0.90  ng/mL     0.00-5.00  2018 4:202018 5:
29am   

 

 Troponin I  0.091  ng/mL     0-0.300  2018 4:202018 5:29am   

 

 Vitamin B12 Level  578  pg/mL     213-816  2018 4:2018 11:
23am   

 

 Folate  19.1  ng/mL   H  7.0-15.4  2018 4:202018 11:23am   

 

 Free Thyroxine  1.66  ng/dL     0.9-1.8  2018 4:2018 11:06am
   

 

 Thyroid Stimulating Hormone (TSH)  0.507  uIU/mL     0.350-4.940  2018 4:
202018 11:06am   

 

 Arterial Blood pH  7.33        7.31-7.41  2018 11:10am  2018 12:
52pm   

 

 Arterial Blood Partial Pressure CO2  67  mmHg  *H  41-51  2018 11:10am  
2018 12:52pm  Results called/hand delivered to ANDREY ROBLES at 1120 on 

 18 by Lester Ngyuen. RB OK.

 

 Arterial Blood Partial Pressure O2  69  mmHg   L    2018 11:10am  
2018 12:52pm   

 

 Arterial Blood HCO3  35  mmol/L   H  23-28  2018 11:10am  2018 12:
52pm   

 

 Arterial Blood Base Excess  9.0  mmol/L   H  -2 - 3  2018 11:10am  2018 12:52pm   

 

 Arterial Blood Oxygen Saturation  91.0  %   L  95-98  2018 11:10am   12:52pm   









Microbiology Results





 Procedure  Source  Organism/Result  Collection Date/Time  Result Date/Time  
Result Status

 

 Blood Culture  Blood   NO GROWTH AFTER 5 DAYS, FINAL REPORT  2018 10:
57am  2018 11:38am  Final

 

 Sputum Culture  Sputum, Expectorated Sputum  STAPHYLOCOCCUS AUREUS-MRSA  2018 8:34pm  2018 6:01am  Final







Procedures







 Procedure  Status  Date  Provider(s)

 

 Ultrasound of chest including mediastinum  Active  18  YORDAN SOTO MD







Encounters







 Encounter  Location  Arrival/Admit Date  Discharge/Depart Date  Attending 
Provider

 

 Discharged Inpatient  [facility]  18 11:57am   4:52pm  BRIAN MELVIN MD

## 2018-03-05 NOTE — DIAGNOSTIC IMAGING REPORT
PROCEDURE:

A single AP view of the chest.

 

COMPARISON: 2/4/18

 

INDICATIONS:   SHORTNESS OF BREATH

     

FINDINGS:

Lines/tubes:  None.

 

Lungs: Limited by low lung volumes and body habitus. Pulmonary vascular 

congestion and moderate interstitial edema.

Pleura:  There is no visible pneumothorax. Small bilateral pleural 

effusions.

 

Heart and mediastinum: Enlarged cardiomediastinal silhouette.

 

Bones:  No acute bony abnormality.

 

IMPRESSION: 

 

Limited as above.

Enlarged cardiomediastinal silhouette, pulmonary vascular congestion, 

moderate interstitial edema, and small bilateral pleural effusions.

 

 

Dictated by:  Misael Morgan M.D. on 3/05/2018 at 17:32     

Electronically approved by:  Misael Morgan M.D. on 3/05/2018 at 17:32

## 2018-03-06 NOTE — DIAGNOSTIC IMAGING REPORT
EXAMINATION:  CHEST SINGLE (PORTABLE)    



INDICATION:           Post intubation 



COMPARISON:  3/5/2018 at 17 hours

     

FINDINGS:

TUBES and LINES:  Endotracheal tube is visualized 1.9 cm above the carly.



LUNGS:  Lungs are not well inflated.  There are bibasilar atelectasis.   There

is perihilar interstitial opacities, consistent with interstitial edema.



PLEURA:  Small right pleural effusion.



HEART AND MEDIASTINUM:  Cardiac size is severely enlarged.    



BONES AND SOFT TISSUES:  No acute osseous lesion.  Soft tissues are

unremarkable.



UPPER ABDOMEN: No free air under the diaphragm.    



IMPRESSION: 

Severe cardiogenic pulmonary edema, slightly improved





Signed by: Dr. Ferdinand Ghosh M.D. on 3/6/2018 12:21 AM

## 2018-03-06 NOTE — DIAGNOSTIC IMAGING REPORT
EXAMINATION:  CHEST XRAY LINE PLACEMENT    



INDICATION:           PICC line placement 



COMPARISON:  3/5/2018

     

FINDINGS:

TUBES and LINES:  Interval placement of right upper extremity PICC line with

tip at the level of the mid SVC. Endotracheal tube is stable in good position



LUNGS:  Lungs are not well inflated.  There are bibasilar atelectasis.   There

is perihilar interstital opacities, consistent with interstitial edema.



PLEURA:  No pleural effusion or pneumothorax.



HEART AND MEDIASTINUM:  Cardiac size is severely enlarged. There are

atherosclerotic calcifications within the aorta.



BONES AND SOFT TISSUES:  No acute osseous lesion.  Soft tissues are

unremarkable.



UPPER ABDOMEN: No free air under the diaphragm.    



IMPRESSION: 

1.  Cardiogenic pulmonary edema, stable.

2.  Right PICC line in good position.





Signed by: Dr. Ferdinand Ghosh M.D. on 3/6/2018 2:46 AM

## 2018-03-06 NOTE — HISTORY AND PHYSICAL
PRIMARY CARE PHYSICIAN:  Dr. Valladares



CHIEF COMPLAINT:  Shortness of breath.



HISTORY OF PRESENT ILLNESS:  This is a 57-year-old woman who was recently 

discharged last month after acute respiratory failure with intubation with 

COPD and CHF exacerbation.  Now, presenting with shortness of breath 

requiring intubation again.  Imaging showed bilateral pleural effusion.  

She is admitted for further evaluation and management.



PAST MEDICAL HISTORY:  COPD, acute respiratory failure in January/February of 2018 requiring intubation, systolic congestive heart failure, acute 

kidney injury, electrolyte derangement, obesity, obstructive sleep apnea, 

diabetes mellitus.



PAST SURGICAL HISTORY:  Unknown.



ALLERGIES:  PER ELECTRONIC MEDICAL RECORD.



FAMILY HISTORY/SOCIAL HISTORY:  Patient's son is involved in her care.  

Previously quit cigarettes.  



MEDICATIONS:  Per electronic medical record.



REVIEW OF SYSTEMS:  Not obtainable.



PHYSICAL EXAMINATION 

VITAL SIGNS:  Have been reviewed.

GENERAL:  A tired-appearing woman resting in bed intubated.

HEENT:  Anicteric.  ET tube is in place. 

CARDIOVASCULAR:  Normal S1 and S2.  

LUNGS:  Moderate breath sounds reduced at the bases with minimal crackles. 

ABDOMEN:  Soft, nontender and nondistended.  

EXTREMITIES:  She has 1+ leg edema bilaterally.  

SKIN:  Dry.

PSYCHIATRIC:  Unable to assess.

NEUROLOGIC:  Sedated and restrained. 

:  Baptiste in place. 



LABS:  Reviewed.



MEDICATIONS:  Reviewed.



ASSESSMENT:  A 57-year-old woman with:

1.  Acute respiratory failure.

2.  Acute exacerbation of systolic congestive heart failure.

3.  Bilateral pleural effusion.  

4.  Hyperkalemia.

5.  Microcytic anemia which is mild to moderate.

6.  Acute kidney injury.



PLAN 

1.  Continue ventilatory support.  Consult Dr. Meraz of pulmonary services 

to assist in management.

2.  Recheck potassium level in the setting of hyperkalemia.

3.  Continue aggressive diuresis with IV Lasix 40 mg IV q.12 h.

4. Continue strict I's and O's.  Continue Baptiste.

5.  Use empiric antibiotics for coverage.

6.  Utilize nebs and steroid therapy as the patient does have COPD and is a 

contributory factor to her current respiratory failure.

7.  Cut the Levemir in half and give 15 units at bedtime.

8.  Utilize steroid regimen.

9.  Utilize heparin 5000 q.12 h. for DVT prophylaxis and Pepcid for GI 

prophylaxis. 



10. Critical care time more than 35 minutes.  











DD:  03/06/2018 05:17

DT:  03/06/2018 05:24

Job#:  [id number]

## 2018-03-06 NOTE — DIAGNOSTIC IMAGING REPORT
PROCEDURE:

A single AP view of the chest.

 

COMPARISON: The 3/6/2018 0217 hours

 

INDICATIONS:   INTUBATED

     

FINDINGS:

Lines/tubes: Stable position of an ET tube and a right-sided PICC.

 

Lungs: Unchanged pulmonary edema.

 

Pleura: Small bilateral effusions.

 

Heart and mediastinum:  The heart is enlarged.

 

Bones:  No acute bony abnormality.

 

IMPRESSION: 

 

Unchanged cardiomegaly with diffuse pulmonary edema. 

 

Boris Byers D.O.  

Dictated by:  Boris Byers D.O. on 3/06/2018 at 7:51     

Electronically approved by:  Boris Byers D.O. on 3/06/2018 at 7:51

## 2018-03-06 NOTE — CONSULTATION
DATE OF CONSULTATION:  March 06, 2018 



REASON FOR CONSULTATION:  Shortness of breath and ventilator management.



HPI:  Ms. Oliveira is a 57-year-old female who is known to me from previous 

admission.  Patient has history of chronic respiratory failure, obstructive 

sleep apnea.  The patient's family has effected by [employer] and they are 

living in a motel.  She is not using the CPAP machine.  Patient has been a 

smoker for 45 years.  She quit a year and a half ago.  She was reintubated 

beginning of January here at [facility].  She is currently 

intubated and sedated.  According to the son, the patient became 

increasingly short of breath and she was brought to the emergency room 

where she became more somnolent and ABG was done which showed a pH of 7.18, 

pCO2 109 and pO2 of 38 and patient was intubated for acute-on-chronic 

hypercapnic respiratory failure.  Currently she is sedated with propofol.



REVIEW OF SYSTEMS:  Unable to elicit any as patient is sedated with 

propofol. 



PAST MEDICAL HISTORY:  Obesity, obstructive sleep apnea, congestive heart 

failure, hypertension and diabetes.



FAMILY HISTORY AND SOCIAL HISTORY:  Ex-smoker and does not drink. 

 

PHYSICAL EXAMINATION:   

VITALS:  Temperature 100.1, pulse of 100, blood pressure 127/64.  She is on 

FIO2 of 60%, rate of 14, PEEP of 5.  She is arousable but sleepy. 

HEENT:  Head atraumatic, normocephalic.  Pupils are reactive. 

NECK:  Supple. 

CHEST:  Reduced air entry bilaterally. 

HEART:  S1 and S2 audible. 

ABDOMEN:  Soft, nontender and nondistended. 

EXTREMITIES:  No clubbing, cyanosis or edema. 

NEUROLOGIC:  She is arousable but sleepy.  No focal neurologic deficit. 



LABORATORY DATA:  PH of 7.18,  PCO2 109, pO2 38.  This was preintubation.  

White count of 8.35.  Hemoglobin 10.4.  Platelets 174,000.  INR is 1.06.  

Sputum gram from February 1 showing MRSA.  There is no Etiology aspirate 

now.



Chest x-ray:  I reviewed the images.  It is showing bilateral alveolar 

infiltrate, appears to be pulmonary edema; however, pneumonia cannot be 

ruled out.



ASSESSMENT AND PLAN:  Ms. Oliveira is a 57-year-old female who presented with 

acute-on-chronic hypoxic and hypercapnic respiratory failure.  Patient has 

history of obstructive sleep apnea and likely has COPD as well as 45 to 

50-pack-year smoking history.  She was in the hospital at the end of 

January with pneumonia as well and at that time the sputum grew out MRSA.



CURRENT PROBLEMS:  

1. Acute-on-chronic hypoxic and hypercapnic respiratory failure.

2. Morbid obesity.

3. Obstructive sleep apnea.

4. High likelihood of chronic obstructive pulmonary disease.

5. Possible pneumonia.



PLAN:  

1. I will start the patient on vancomycin and Zosyn, previous history of 

methicillin-resistant Staphylococcus aureus pneumonia.

2. Continue the patient on IV Solu-Medrol as ordered.

3. DuoNeb treatment.

4. Continue the ventilator support.  Vent settings reviewed.  I will 

check ABGs

5. Will order Lovenox subcutaneous for DVT prophylaxis.  She is already 

on she GI prophylaxis.  



Discussed with son at bedside in detail.  



 





DD:  03/06/2018 18:18

DT:  03/06/2018 20:07

Job#:  [id number]

## 2018-03-07 NOTE — PROGRESS NOTE
DATE:  March 07, 2018 



TIME:  8:27 a.m.



OVERNIGHT:  Remains intubated.



REVIEW OF SYSTEMS:  Unobtainable.



PHYSICAL EXAMINATION 

VITAL SIGNS:  Reviewed.

GENERAL:  A tired-appearing woman resting in bed. 

HEENT:  Anicteric.  ET tube in place. 

CARDIOVASCULAR:  Normal S1 and S2.  

LUNGS:  Reduced breath sounds at the bases. 

ABDOMEN:  Soft and nondistended.  

:  She has a Baptiste in place.

EXTREMITIES:  Trace to 1+ leg edema. 

SKIN:  Dry.

PSYCHIATRIC:  Unable to assess.

NEUROLOGIC:  Sedated. 



LABS:  Reviewed.



MEDICATIONS:  Reviewed.



ASSESSMENT:  A 57-year-old woman.

1. Acute respiratory failure.

2. Acute exacerbation of systolic congestive heart failure.

3. Bilateral pleural effusions.  

4. Hyperkalemia.

5. Microcytic anemia, which is mild to moderate.

6. Acute kidney injury.

7. Chronic obstructive pulmonary disease.

8. Diabetes mellitus, type 2.



PLAN 

1. Continue diuresis.

2. Continue ventilatory support on 60% FiO2.

3. No leukocytosis.

4. Her renal function is essentially unchanged.  Will continue to 

monitor while the patient is being diuresed.

5. Influenza screening is negative. 

6. I's and O's are 1800 out yesterday. 

7. Chest x-ray this morning shows some interstitial edema and small 

pleural effusion.

8. Continue supportive care in the ICU.  Vent support per pulmonary 

services.

9. Continue heparin and Pepcid.

10. Monitor blood sugar levels.



Critical care time more than 35 minutes.







DD:  03/07/2018 08:29

DT:  03/07/2018 08:39

Job#:  [id number]

## 2018-03-07 NOTE — DIAGNOSTIC IMAGING REPORT
EXAMINATION:  CHEST SINGLE (PORTABLE)    



INDICATION:           Ventilated 



COMPARISON:  3/6/2018

     

FINDINGS:

TUBES and LINES:  Endotracheal tube 3.8 cm above the carly. NG tube is now in

place in good position and right upper extremity PICC line is stable



LUNGS:  Lungs are not well inflated.  There are bibasilar atelectasis.   There

is perihilar interstital opacities, consistent with interstitial edema.



PLEURA:  Small bilateral pleural effusion.



HEART AND MEDIASTINUM:  Cardiac size is severely enlarged. There are

atherosclerotic calcifications within the aorta.



BONES AND SOFT TISSUES:  No acute osseous lesion.  Soft tissues are

unremarkable.



UPPER ABDOMEN: No free air under the diaphragm.    



IMPRESSION: 

1.  Stable cardiomegaly with interstitial edema and small pleural effusions

2.  Tubes and lines are in good position





Signed by: Dr. Ferdinand Ghosh M.D. on 3/7/2018 6:50 AM

## 2018-03-08 NOTE — PROGRESS NOTE
DATE:  March 08, 2018 



TIME:  7:37 a.m.



OVERNIGHT:  Patient extubated, now on BiPAP. 



REVIEW OF SYSTEMS:  Denies any chest pain.  



PHYSICAL EXAMINATION 

VITAL SIGNS:  Reviewed.

GENERAL:  A tired-appearing woman resting in bed. 

HEENT:  Anicteric.  BiPAP mask in place.

CARDIOVASCULAR:  Normal S1 and S2.  

LUNGS:  Reduced breath sounds throughout. 

ABDOMEN:  Soft, nontender and nondistended.  

EXTREMITIES:  Trace edema. 

SKIN:  Dry.

PSYCHIATRIC:  Flat affect.

NEUROLOGIC:  Awake and alert.



LABS:  Reviewed.



MEDICATIONS:  Reviewed.



ASSESSMENT:  A 57-year-old woman.

1. Acute respiratory failure.

2. Acute exacerbation of systolic congestive heart failure.

3. Bilateral pleural effusions.  

4. Hyperkalemia.

5. Microcytic anemia, mild to moderate.

6. Acute kidney injury.

7. Chronic obstructive pulmonary disease.

8. Diabetes mellitus, type 2.



PLAN 

1. Continue diuresis.

2. Patient now on BiPAP support. 

3. Monitor renal function. 

4. Monitor fluid status.  I's and O's are 1800/1800.

5. The influenza screen was negative. 

6. Continue aztreonam and azithromycin as well as IV Solu-Medrol.

7. Patient is on Lasix 40 IV q.12.



Critical care time more than 35 minutes.







DD:  03/08/2018 07:39

DT:  03/08/2018 07:40

Job#:  [id number]

## 2018-03-09 NOTE — CONSULTATION
DATE OF CONSULTATION:  March 08, 2018 



CARDIOLOGY CONSULTATION 



REASON FOR CONSULTATION:  AFib, A-flutter.



REQUESTING PHYSICIAN:  Dr. Valladares.



HPI:  This is a morbidly obese, 57-year-old female that presented with 

shortness of breath.  She was intubated and later extubated.  Yesterday she 

developed AFib and A-flutter, and cardiology was consulted.  She was given 

Cardizem and started on amiodarone drip.  Heart rate is still in the 150s.  

She denies any headache, any nausea, vomiting or dizziness.  She is 

complaining of shortness of breath.  She has a history of COPD, 

noncompliant with CPAP machine at home.  She lives in a motel due to 

[place].  She denies any chest pain also.  Her troponin was 

negative.  EKG showed AFib and A-flutter.  BNP is 878.    She has a history 

of multiple intubations in the past due to respiratory distress. 



PAST MEDICAL HISTORY:  CHF, obesity, hypertension, diabetes, sleep apnea, 

COPD.  



PAST SURGICAL HISTORY:  Hysterectomy, tonsillectomy, cyst removal from the 

face, cardiac catheterization with no intervention, and cyst removal from 

the left elbow.  



FAMILY HISTORY:  Positive for CAD.



SOCIAL HISTORY:  She quit smoking.  She lives at home with family.



MEDICATIONS:  See med list.



ALLERGIES:  SHE HAS MULTIPLE ALLERGIES, SEE CHART.  



REVIEW OF SYSTEMS:  Negative, except those mentioned above.



PHYSICAL EXAMINATION

VITAL SIGNS:  Temperature 98, heart rate 150, blood pressure 108/65, 

respirations 18, oxygen saturation 96% on nasal cannula.  

GENERAL:  She is morbidly obese, awake, alert and oriented times 3.  

HEENT:  Mucous membranes moist. 

NECK:  Supple. 

LUNGS:  Bilaterally with decreased breath sounds. 

CARDIOVASCULAR:  Irregular. 

ABDOMEN:  Soft. 

NEUROLOGIC:  Intact.

EXTREMITIES:  No edema. 



LABORATORY DATA:  Sodium 145, potassium 4.1, chloride 101, CO2 32, BUN 53, 

creatinine 1.54.  Glucose 133.  White blood cells 9.39, hemoglobin 12.3, 

hematocrit 39.3, platelets 221.  PT 13.1, PTT 29.4, INR 1.06.  



IMPRESSION

1. Atrial fibrillation and atrial flutter.

2. Acute respiratory distress.  

3. Chronic obstructive pulmonary disease.

4. Diabetes.  

5. Renal insufficiency.  

6. History of deep vein thrombosis on the left leg.

7. Pneumonia.  



ASSESSMENT AND PLAN:  She had an echo done in January that showed EF 50% to 

60%.  We will go ahead and get another echo since she had this A-flutter to 

reassess the LV function.  Will continue amiodarone drip.  Will give her 

digoxin times 1.  She is on heparin subcutaneous. Will check her magnesium 

and get a 12-lead EKG because on the monitor she is flip-flopping.  We will 

continue her home medications and beta blocker also.  Further cardiac 

workup pending clinical course.  



Thank you for this consultation.



Dictated by Shefali Ball NP.

 





DD:  03/09/2018 09:05

DT:  03/09/2018 09:19

Job#:  [id number]

## 2018-03-11 NOTE — DIAGNOSTIC IMAGING REPORT
EXAMINATION:  CHEST SINGLE (PORTABLE)    



INDICATION:           CHF. 



COMPARISON:  None

     

FINDINGS:

TUBES and LINES:  Right upper extremity PICC line with tip at the level of the

mid SVC.



LUNGS:  Lungs are not well inflated.  There are bibasilar atelectasis.   There

is mild prominence of the central pulmonary vasculature, consistent with

pulmonary venous congestion. Mild interlobular septi thickening present.



PLEURA:  No pleural effusion or pneumothorax.



HEART AND MEDIASTINUM:  Cardiac size is severely enlarged. There are

atherosclerotic calcifications within the aorta.



BONES AND SOFT TISSUES:  No acute osseous lesion.  Soft tissues are

unremarkable.



UPPER ABDOMEN: No free air under the diaphragm.    



IMPRESSION: 

Cardiogenic mild pulmonary edema.





Signed by: Dr. Ferdinand Ghosh M.D. on 3/11/2018 7:32 AM

## 2018-03-12 NOTE — CARDIOLOGY REPORT
DATE OF STUDY:  March 12, 2018 



TRANSESOPHAGEAL ECHOCARDIOGRAM 



INDICATIONS:  Rule out thrombus prior to cardioversion.



DESCRIPTION OF PROCEDURE:  After informed consent, the patient was 

anesthetized by the anesthesiologist.  Transesophageal probe was passed 

without any difficulty, and images were being obtained.  However, the 

patient desaturated.  The probe was removed and the patient was bagged by 

the anesthesiologist.  Saturations went up to the 90s, and the patient was 

reintubated.  A limited transesophageal echocardiogram was performed to 

rule out thrombus.



REPORT:  This is a limited study.  

1.  Left ventricle:  Appears to be of normal size with preserved systolic 

function.  Overall estimated ejection fraction appears to be about 50%.

2.  Left atrium:  Appears to be dilated.  No spontaneous echo contrast or 

thrombus is seen in the left atrium or left atrial appendage.  

3.  Right atrium:  Appears to be mildly dilated.

4.  Right ventricle:  Appears to be normal.

5.  Mitral valve is thickened without valve excursion.  No vegetations are 

noted on the mitral valve leaflets.  Mild mitral regurgitation is noted.

6.  Tricuspid valve:  Is poorly visualized.  Trace tricuspid regurgitation 

is noted.  No obvious vegetations are noted.

7.  Aortic valve:  The view seen appears to be thickened.



CONCLUSIONS

1.  Technically limited study.

2.  Left ventricle is of normal size with preserved systolic function.

3.  The overall estimated ejection fraction is about 50%.

4.  Left atrium is dilated.

5.  No spontaneous echo contrast or thrombus is seen in the left atrium or 

left atrial appendage.

6.  Mild mitral regurgitation.  

7.  Mild tricuspid regurgitation is noted.

8.  No intracardiac thrombi or vegetation noted.









DD:  03/12/2018 15:33

DT:  03/12/2018 15:45

Job#:  [id number]

## 2018-03-12 NOTE — OPERATIVE REPORT
DATE OF PROCEDURE:    

 

PROCEDURE PERFORMED:  Synchronized cardioversion.



INDICATIONS:  Atrial flutter with rapid ventricular rate.



DESCRIPTION OF PROCEDURE:  After informed consent, the patient underwent 

transesophageal echocardiogram.  No thrombus was noted in the left atrium 

or left atrial appendage.  Paddles were placed on the patient's chest.  

Patient was cardioverted by synchronized cardioversion at 75 joules.  

Patient converted to sinus rhythm with 1 shock.  Patient desaturated during 

the transesophageal echocardiogram and so the probe had to be removed and 

reinserted.  The anesthesiologist anesthetized the patient.  Overall, the 

patient tolerated the procedure.  Patient is in sinus bradycardia.







DD:  03/12/2018 15:35

DT:  03/12/2018 15:50

Job#:  [id number]

## 2018-03-14 NOTE — DIAGNOSTIC IMAGING REPORT
CHEST SINGLE (PORTABLE), 3/14/2018 5:00 AM



Technique: CHEST SINGLE (PORTABLE)

Comparison: 3/11/2018

Clinical history: Congestive heart failure



Findings:

Limited by overlying soft tissue attenuation and portable technique.



Impression:

1. Lines/Tubes: Stable right PICC over the SVC.

2. Stable enlarged cardiac silhouette.

3. Persistent edema with bibasilar atelectasis and small effusions.



Signed by: Dr Bea Hicks MD on 3/14/2018 6:22 AM

## 2018-03-20 NOTE — DIAGNOSTIC IMAGING REPORT
PROCEDURE:

A single AP view of the chest.

 

COMPARISON: Patients [facility], , CHEST SINGLE (PORTABLE), 

3/14/2018, 5:32.

 

INDICATIONS:   COPD, ACUTE RESPIRATORY DISTRESS, AFIB,ATRIAL FLUTTER

     

FINDINGS:

See impression.

 

IMPRESSION: 

 

1. right-sided PICC line is unchanged.

2. Slight interval decrease in bilateral pulmonary edema. Persistent 

enlarged cardiac silhouette, central pulmonary venous congestion and 

perihilar interstitial edema. No consolidation.

3. Likely small right pleural effusion. 

 

Waylon Rizo M.D.  

Dictated by:  Waylon Rizo M.D. on 3/20/2018 at 13:09     

Electronically approved by:  Waylon Rizo M.D. on 3/20/2018 at 

13:09

## 2018-03-24 NOTE — DISCHARGE SUMMARY
ADMISSION DIAGNOSES

1.  Acute respiratory failure.

2.  Acute exacerbation of systolic heart failure.

3.  Bilateral pleural effusions.

4.  Hyperkalemia.

5.  Microcytic anemia.

6.  Acute kidney injury.



DISCHARGE DIAGNOSES

1.  Acute respiratory failure.

2.  Acute exacerbation of systolic heart failure.

3.  Bilateral pleural effusions.

4.  Hyperkalemia.

5.  Microcytic anemia.

6.  Acute kidney injury.

7.  Atrial fibrillation, rapid ventricular response.

8.  Hypomagnesemia.

9.  Hypocalcemia.

10.  Hypertension.



HISTORY:  Patient has a history of obesity, obstructive sleep apnea, 

congestive heart failure, hypertension, and diabetes.



HOSPITAL COURSE:  A 57-year-old female who was recently discharged last 

month after acute respiratory failure with intubation with COPD and CHF, 

now presents with shortness of breath requiring intubation again.  Imaging 

showed bilateral pleural effusions.  Chest x-ray showed enlarged 

cardiomediastinal silhouette with ______ vascular _______ _______ _______ 

bilateral effusions and then on March 5, 2018, severe cardiogenic pulmonary 

edema, slightly improved.  Patient had sputum culture _______.  Patient was 

started on vancomycin and Zosyn as she previously had MRSA pneumonia.  

Patient was also started on IV Solu-Medrol, neb treatments.  She was also 

given Lasix 40 IV q.12 h.



Patient on March 7, 2018, developed AFib, RVR, and cardiology was 

consulted.  She was given Cardizem and started on _______.  Her heart rate 

was still in the 150s.  They did a cardioversion on March 12, 2018, and 

patient went into sinus bradycardia and remained in sinus rhythm the 

remainder of hospital stay.



Patient _______ was negative, urine cultures were negative, blood cultures 

were negative.  Patient remained stable and moved out of ICU.  She 

apparently used CPAP at home but was not compliant; so, _______ be 

admitted.  We tried to give her BiPAP.  She continued to deny it. Prolonged 

patient's hospitalization as it was not _______ discharge her without it.  

_______ they approved the vent.



The patient was sent home with son with a noninvasive vent and physical 

therapy _______ care.  She has a skin tear on her left buttock.  Patient 

was sent home with amiodarone, wound supplements _______ 90 daily, Eliquis 

_______.



Patient will follow up with primary care in 1 to 2 weeks and follow up with 

cardiology in 1 to 2 weeks as well.



Dictated by:  Catrina Martinez NP 







 _________________________________

BRIAN MELVIN MD



DD:  03/23/2018 18:46

DT:  03/24/2018 00:27

Job#:  [id number]